# Patient Record
Sex: MALE | Race: BLACK OR AFRICAN AMERICAN | Employment: FULL TIME | URBAN - METROPOLITAN AREA
[De-identification: names, ages, dates, MRNs, and addresses within clinical notes are randomized per-mention and may not be internally consistent; named-entity substitution may affect disease eponyms.]

---

## 2017-02-27 ENCOUNTER — HOSPITAL ENCOUNTER (INPATIENT)
Facility: CLINIC | Age: 32
LOS: 2 days | Discharge: HOME | DRG: 885 | End: 2017-03-01
Attending: PSYCHIATRY & NEUROLOGY | Admitting: PSYCHIATRY & NEUROLOGY
Payer: MEDICARE

## 2017-02-27 DIAGNOSIS — F20.0 CHRONIC PARANOID SCHIZOPHRENIA (H): ICD-10-CM

## 2017-02-27 PROBLEM — F20.9 SCHIZOPHRENIA (H): Status: ACTIVE | Noted: 2017-02-27

## 2017-02-27 LAB
AMPHETAMINES UR QL SCN: NORMAL
BARBITURATES UR QL: NORMAL
BENZODIAZ UR QL: NORMAL
CANNABINOIDS UR QL SCN: NORMAL
COCAINE UR QL: NORMAL
ETHANOL UR QL SCN: NORMAL
OPIATES UR QL SCN: NORMAL

## 2017-02-27 PROCEDURE — 12400001 ZZH R&B MH UMMC

## 2017-02-27 PROCEDURE — 80320 DRUG SCREEN QUANTALCOHOLS: CPT | Performed by: FAMILY MEDICINE

## 2017-02-27 PROCEDURE — 90791 PSYCH DIAGNOSTIC EVALUATION: CPT

## 2017-02-27 PROCEDURE — 80307 DRUG TEST PRSMV CHEM ANLYZR: CPT | Performed by: FAMILY MEDICINE

## 2017-02-27 PROCEDURE — 99285 EMERGENCY DEPT VISIT HI MDM: CPT | Mod: 25

## 2017-02-27 PROCEDURE — 99284 EMERGENCY DEPT VISIT MOD MDM: CPT | Mod: Z6 | Performed by: PSYCHIATRY & NEUROLOGY

## 2017-02-27 RX ORDER — HYDROXYZINE HYDROCHLORIDE 25 MG/1
25-50 TABLET, FILM COATED ORAL EVERY 4 HOURS PRN
Status: DISCONTINUED | OUTPATIENT
Start: 2017-02-27 | End: 2017-03-01 | Stop reason: HOSPADM

## 2017-02-27 RX ORDER — DIPHENHYDRAMINE HCL 50 MG
50 CAPSULE ORAL DAILY
COMMUNITY

## 2017-02-27 RX ORDER — OLANZAPINE 10 MG/1
30 TABLET ORAL AT BEDTIME
Status: DISCONTINUED | OUTPATIENT
Start: 2017-02-28 | End: 2017-03-01 | Stop reason: HOSPADM

## 2017-02-27 RX ORDER — PROPRANOLOL HYDROCHLORIDE 20 MG/1
20 TABLET ORAL 3 TIMES DAILY
Status: DISCONTINUED | OUTPATIENT
Start: 2017-02-28 | End: 2017-03-01 | Stop reason: HOSPADM

## 2017-02-27 RX ORDER — LITHIUM CARBONATE 300 MG/1
600 TABLET, FILM COATED, EXTENDED RELEASE ORAL 2 TIMES DAILY
Status: DISCONTINUED | OUTPATIENT
Start: 2017-02-28 | End: 2017-03-01 | Stop reason: HOSPADM

## 2017-02-27 ASSESSMENT — ENCOUNTER SYMPTOMS
SHORTNESS OF BREATH: 0
ABDOMINAL PAIN: 0
NERVOUS/ANXIOUS: 0
HALLUCINATIONS: 0
DYSPHORIC MOOD: 1
FEVER: 0

## 2017-02-27 NOTE — IP AVS SNAPSHOT
MRN:[id number]                      After Visit Summary   2/27/2017    Neil Andersen    MRN: [id number]           Thank you!     Thank you for choosing [facility] for your care. Our goal is always to provide you with excellent care.        Patient Information     Date Of Birth          1985        About your hospital stay     You were admitted on:  February 27, 2017 You last received care in the:   20NB    You were discharged on:  March 1, 2017       Who to Call     For medical emergencies, please call 911.  For non-urgent questions about your medical care, please call your primary care provider or clinic, [phone]          Attending Provider     Provider Specialty    Minesh Nance MD Emergency Medicine    [facility], Victor Manuel Echeverria MD Psychiatry    Stuart Priest MD Psychiatry    [facility]       Primary Care Provider Office Phone # Fax #    Stashanice A MD [place] [phone]       [facility] [address] [place] [address]        Further instructions from your care team       Behavioral Discharge Planning and Instructions    Summary: Patient admitted from [facility] for suicidal ideation and hopelessness. Prior to [place], patient was discharged from [facility] for alcohol intoxication. Patient has declined referral for MICD treatment and is looking for housing. He will be discharged to work with his [facility] .     Main Diagnosis: mood disorder, alcohol abuse    Major Treatments, Procedures and Findings: Psychological assessment, medication management    Symptoms to Report: feeling more aggressive, increased confusion, losing more sleep, mood getting worse or thoughts of suicide    Lifestyle Adjustment: Please refrain from all mood altering substances    Psychiatry Follow-up:   [facility]  [phone]   FAX:[phone] [facility] is requesting the hospital fax the discharge summary prior to making an  "appointment.  [address] [facility]  Dr. Collado        from [facility] Miguel [phone]  (c) [phone]    Resources:   Crisis Intervention: 133.487.2050 or 772-866-9737 (TTY: 988.768.7568).  Call anytime for help.  National [place] on Mental Illness (www.mn.amada.org): 480.401.2254 or 869-181-8963.  Alcoholics Anonymous (www.alcoholics-anonymous.org): Check your phone book for your local chapter.  Suicide Awareness Voices of Education (SAVE) (www.save.org): 264-888-LRAT (6053)  National Suicide Prevention Line (www.mentalOMsignal.org): 501-996-EOZN (5489)    General Medication Instructions:   See your medication sheet(s) for instructions.   Take all medicines as directed.  Make no changes unless your doctor suggests them.   Go to all your doctor visits.  Be sure to have all your required lab tests. This way, your medicines can be refilled on time.  Do not use any drugs not prescribed by your doctor.  Avoid alcohol.      The treatment team has appreciated the opportunity to work with you.  We wish you the best in the future. If you have any questions or concerns our unit number is [phone].    Pending Results     No orders found from 2/25/2017 to 2/28/2017.            Statement of Approval     Ordered          03/01/17 1517  I have reviewed and agree with all the recommendations and orders detailed in this document.  EFFECTIVE NOW     Approved and electronically signed by:  Stefan Guerra MD             Admission Information     Date & Time Provider Department Dept. Phone    2/27/2017 Stefan Guerra MD [facility] [phone]      Your Vitals Were     Blood Pressure Pulse Temperature Respirations Pulse Oximetry       129/78 89 98.6  F (37  C) (Tympanic) 16 98%       [employer] Information     [employer] lets you send messages to your doctor, view your test results, renew your prescriptions, schedule appointments and more. To sign up, go to [email or web address] . Click on \"Log " "in\" on the left side of the screen, which will take you to the Welcome page. Then click on \"Sign up Now\" on the right side of the page.     You will be asked to enter the access code listed below, as well as some personal information. Please follow the directions to create your username and password.     Your access code is: [email or web address]  Expires: 2017  3:29 PM     Your access code will  in 90 days. If you need help or a new code, please call your [place] clinic or [phone].        Care EveryWhere ID     This is your Care EveryWhere ID. This could be used by other organizations to access your [place] medical records  [id number]           Review of your medicines      CONTINUE these medicines which have NOT CHANGED        Dose / Directions    BANOPHEN 50 MG capsule   Indication:  Extrapyramidal Reaction   Generic drug:  diphenhydrAMINE        Dose:  50 mg   Take 50 mg by mouth daily   Refills:  0       CEROVITE ADVANCED FORMULA PO        Refills:  0       FLUoxetine 20 MG capsule   Commonly known as:  PROzac   Used for:  Paranoid schizophrenia, chronic condition (H)        Dose:  60 mg   Take 3 capsules (60 mg) by mouth At Bedtime   Quantity:  90 capsule   Refills:  1       lithium 300 MG CR tablet   Commonly known as:  ESKALITH/LITHOBID   Used for:  Paranoid schizophrenia, chronic condition (H)        Dose:  600 mg   Take 2 tablets (600 mg) by mouth 2 times daily   Quantity:  120 tablet   Refills:  1       OLANZapine 15 MG tablet   Commonly known as:  zyPREXA   Used for:  Paranoid schizophrenia, chronic condition (H)        Dose:  30 mg   Take 2 tablets (30 mg) by mouth At Bedtime   Quantity:  60 tablet   Refills:  1       propranolol 20 MG tablet   Commonly known as:  INDERAL   Used for:  Paranoid schizophrenia, chronic condition (H), Extrapyramidal symptom        Dose:  20 mg   Take 1 tablet (20 mg) by mouth 3 times daily   Quantity:  90 tablet   Refills:  1         STOP taking     NICORELIEF MT "                    Protect others around you: Learn how to safely use, store and throw away your medicines at www.disposemymeds.org.             Medication List: This is a list of all your medications and when to take them. Check marks below indicate your daily home schedule. Keep this list as a reference.      Medications           Morning Afternoon Evening Bedtime As Needed    BANOPHEN 50 MG capsule   Take 50 mg by mouth daily   Generic drug:  diphenhydrAMINE                                CEROVITE ADVANCED FORMULA PO                                FLUoxetine 20 MG capsule   Commonly known as:  PROzac   Take 3 capsules (60 mg) by mouth At Bedtime   Last time this was given:  60 mg on 2/28/2017  8:14 PM                                lithium 300 MG CR tablet   Commonly known as:  ESKALITH/LITHOBID   Take 2 tablets (600 mg) by mouth 2 times daily   Last time this was given:  600 mg on 3/1/2017 10:50 AM                                OLANZapine 15 MG tablet   Commonly known as:  zyPREXA   Take 2 tablets (30 mg) by mouth At Bedtime   Last time this was given:  30 mg on 2/28/2017  8:14 PM                                propranolol 20 MG tablet   Commonly known as:  INDERAL   Take 1 tablet (20 mg) by mouth 3 times daily   Last time this was given:  20 mg on 3/1/2017  2:25 PM

## 2017-02-27 NOTE — IP AVS SNAPSHOT
[address] [address]    Phone:  [phone]                                       After Visit Summary   2/27/2017    Neil Andersen    MRN: [id number]           After Visit Summary Signature Page     I have received my discharge instructions, and my questions have been answered. I have discussed any challenges I see with this plan with the nurse or doctor.    ..........................................................................................................................................  Patient/Patient Representative Signature      ..........................................................................................................................................  Patient Representative Print Name and Relationship to Patient    ..................................................               ................................................  Date                                            Time    ..........................................................................................................................................  Reviewed by Signature/Title    ...................................................              ..............................................  Date                                                            Time

## 2017-02-28 PROCEDURE — H2032 ACTIVITY THERAPY, PER 15 MIN: HCPCS

## 2017-02-28 PROCEDURE — 12400001 ZZH R&B MH UMMC

## 2017-02-28 PROCEDURE — 90853 GROUP PSYCHOTHERAPY: CPT

## 2017-02-28 PROCEDURE — 25000132 ZZH RX MED GY IP 250 OP 250 PS 637: Mod: GY | Performed by: PSYCHIATRY & NEUROLOGY

## 2017-02-28 PROCEDURE — A9270 NON-COVERED ITEM OR SERVICE: HCPCS | Mod: GY | Performed by: PSYCHIATRY & NEUROLOGY

## 2017-02-28 PROCEDURE — 99223 1ST HOSP IP/OBS HIGH 75: CPT | Mod: AI | Performed by: PSYCHIATRY & NEUROLOGY

## 2017-02-28 RX ADMIN — PROPRANOLOL HYDROCHLORIDE 20 MG: 20 TABLET ORAL at 20:14

## 2017-02-28 RX ADMIN — FLUOXETINE 60 MG: 20 CAPSULE ORAL at 00:06

## 2017-02-28 RX ADMIN — LITHIUM CARBONATE 600 MG: 300 TABLET, FILM COATED, EXTENDED RELEASE ORAL at 20:56

## 2017-02-28 RX ADMIN — HYDROXYZINE HYDROCHLORIDE 50 MG: 25 TABLET ORAL at 00:06

## 2017-02-28 RX ADMIN — OLANZAPINE 30 MG: 10 TABLET, FILM COATED ORAL at 00:06

## 2017-02-28 RX ADMIN — OLANZAPINE 30 MG: 10 TABLET, FILM COATED ORAL at 20:14

## 2017-02-28 RX ADMIN — PROPRANOLOL HYDROCHLORIDE 20 MG: 20 TABLET ORAL at 09:09

## 2017-02-28 RX ADMIN — LITHIUM CARBONATE 600 MG: 300 TABLET, FILM COATED, EXTENDED RELEASE ORAL at 00:05

## 2017-02-28 RX ADMIN — FLUOXETINE 60 MG: 20 CAPSULE ORAL at 20:14

## 2017-02-28 RX ADMIN — PROPRANOLOL HYDROCHLORIDE 20 MG: 20 TABLET ORAL at 13:44

## 2017-02-28 RX ADMIN — LITHIUM CARBONATE 600 MG: 300 TABLET, FILM COATED, EXTENDED RELEASE ORAL at 09:09

## 2017-02-28 ASSESSMENT — ACTIVITIES OF DAILY LIVING (ADL)
SWALLOWING: 0-->SWALLOWS FOODS/LIQUIDS WITHOUT DIFFICULTY
ORAL_HYGIENE: INDEPENDENT
AMBULATION: 0-->INDEPENDENT
GROOMING: INDEPENDENT
TRANSFERRING: 0-->INDEPENDENT
DRESS: SCRUBS (BEHAVIORAL HEALTH)
BATHING: 0-->INDEPENDENT
COGNITION: 0 - NO COGNITION ISSUES REPORTED
DRESS: 0-->INDEPENDENT
DRESS: INDEPENDENT
RETIRED_EATING: 0-->INDEPENDENT
ORAL_HYGIENE: INDEPENDENT
FALL_HISTORY_WITHIN_LAST_SIX_MONTHS: NO
RETIRED_COMMUNICATION: 0-->UNDERSTANDS/COMMUNICATES WITHOUT DIFFICULTY
HYGIENE/GROOMING: INDEPENDENT
PRIOR_FUNCTIONAL_LEVEL_COMMENT: VERY GOOD
TOILETING: 0-->INDEPENDENT

## 2017-02-28 NOTE — ED NOTES
Pt arrived via EMS for feelings of hopelessness. Staff at crisis center overheard pt speaking of suicide.

## 2017-02-28 NOTE — PROGRESS NOTES
Patient was redirected after hugging female patient.  Upset with staff, said we were not his parents.  Irritable with redirection.

## 2017-02-28 NOTE — PROGRESS NOTES
02/28/17 1343   Behavioral Health   Hallucinations denies / not responding to hallucinations   Thinking distractable   Orientation person: oriented;place: oriented   Memory baseline memory   Insight poor   Judgement impaired   Eye Contact at examiner   Affect irritable;blunted, flat;angry   Mood depressed;irritable   Physical Appearance/Attire attire appropriate to age and situation   Hygiene well groomed   Suicidality other (see comments)  (Refused to talk )   Self Injury other (see comment)  (Refused to talk)   Activity restless   Speech clear   Psycho Education   Type of Intervention 1:1 intervention   Response refuses   Treatment Detail Refused   Activities of Daily Living   Hygiene/Grooming independent   Oral Hygiene independent   Dress independent   Room Organization independent     Early in the shift patient was irritable and short with staff.  In late morning patient was out and pacing in hallway and interacting with peers, and appeared to he in a better mood, but still flat.  Patient was engaged with peers, but refused to talk with staff for a check in.

## 2017-02-28 NOTE — H&P
"DATE OF ASSESSMENT:  02/28/2017      IDENTIFYING INFORMATION:  Neil Andersen is a 31-year-old  male, currently homeless, unemployed and on Social Security Disability.      CHIEF COMPLAINT:  \"I'm homeless, I was kicked out of my IRTS and I can't see my daughter.\"      HISTORY OF PRESENT ILLNESS:  The patient has a history of schizophrenia who was referred to the emergency room for mental health admission by the [facility] facility due to concern for ongoing suicidal thoughts.  The patient had been admitted to the [facility] approximately 2-1/2 months ago.  He recalls doing poorly there, mostly due to his frequent usage of alcohol.  He described that on a daily basis he would leave the facility to acquire and consume alcohol and would frequently return intoxicated.  Approximately 1 week ago, they once again noted his state of intoxication and also noted inappropriate conduct, which the patient is not willing to clarify for me today, which eventually resulted in him being kicked out of the residential treatment facility.  He was then referred to the [facility], however continued to report a strong intensity of suicidal urges, prompting them to refer him to the emergency room for mental health admission.  In our emergency room he reported various plans for suicide, ranging from cutting his throat to hanging himself or jumping off of a bridge.  He was seeking inpatient hospitalization.  On examination today, he tells me that he is depressed secondary to his homeless situation.  Other psychosocial stressors he identified include not being able to see his daughter as often as he would like.  He further described limited social supports, describing his friends as being negative influences and having no meaningful relationship with family members.  He feels isolated, helpless, hopeless and sad.  He questions why he is not on a wait list for Section 8 housing " and questions the efficiency of his .  He denied any psychotic symptoms and had no other meaningful reports today.      PSYCHIATRIC REVIEW OF SYSTEMS:  Regarding the depressive episode, he tells me that his mood is depressed which he characterized as severe.  He further reports low energy, low appetite, low concentration.  He described anhedonia.  He feels helpless, hopeless.  He endorses suicidal thoughts.  He feels safe in the hospital.  He denied homicidal thoughts.  He denied symptoms of aayush.  He denied psychotic symptoms, including auditory and visual hallucinations.  He denied any paranoid delusions, howeverdid appear moderately paranoid on meeting today.  No ideas of reference, thought insertion or thought extraction identified.  He denied symptoms corresponding to DYLAN, panic disorder, PTSD, eating disorder or OCD.      PAST PSYCHIATRIC HISTORY:  Established diagnosis of schizophrenia with multiple prior inpatient hospitalizations, 2 prior suicide attempts via medication overdose in 2010 and 2011.  He has been on multiple prior medication trials, some of which have included Risperdal, Seroquel, Abilify, Zyprexa, lithium, Prozac, however has most recently been maintained on a combination of lithium, Prozac and Zyprexa.  Records indicate at least 3 prior court-ordered mental health treatments with no active commitment identified.  His care is managed by his outpatient psychiatrist.  He currently does not have a therapist.      SUBSTANCE ABUSE HISTORY:  Long history of cannabis and alcohol usage.  His most frequented substance is alcohol, describing regular usage on a daily basis while in residential treatment over the past 2-3 months.  He was not able to report any meaningful dependence or withdrawal to alcohol.  His last alcoholic beverage was approximately 1 week ago.  Cannabis usage is intermittent, smoking when available.  No recent usage reported.  He has been to treatment in the past.  No  complicated withdrawals stemming from DTs or seizures reported.      MEDICAL HISTORY:  No active issues identified.      FAMILY HISTORY:  No knowledge of mental health or suicides in the family; however, he was guarded when questioned regarding family.      SOCIAL HISTORY:  Refer to the psychosocial assessment completed by our .  The patient is currently on Social Security Disability.  He is unemployed and homeless.  He was most recently living in a residential treatment facility.  He identified limited social supports and limited involvement with family.  He referenced 1 daughter, however has not had recent contact with her.  No active legal issues reported.  He did make reference to a  whom he frequently works with.      MEDICAL REVIEW OF SYSTEMS:  A 10-point systems were reviewed and were positive for psychiatric symptoms, as noted above, otherwise negative.      PHYSICAL EXAMINATION:  Blood pressure 125/86, pulse 78, temperature 98.3, respirations 16.  The rest of the physical examination was reviewed in the emergency room documentation.      MENTAL STATUS EXAMINATION:  The patient appears his stated age, dressed in hospital scrubs with adequate hygiene.  He was sitting out in the common area by himself prior to our meeting.  No psychomotor abnormalities noted.  He was initially guarded and hesitant to participate in the interview, questioning my role in this hospital as well as my role in his plan of care.  After reviewing my name ailin and asking me a few similar questions, he gained enough comfort to proceed with the interview.  No psychomotor abnormalities.  Eye contact was occasional.  Speech was limited in content, not pushed or pressured.  His mood was described as being depressed.  Affect was mostly flat; however, there were moments where he would joke and smile accordingly, mostly when he described his need to abstain from alcohol.  His thought process was linear,  tight, concrete.  Associations were intact.  Thought content displayed evidence of paranoia, given his guarded presentation.  He endorsed suicidal thoughts, however was able to contract for safety in the hospital.  He denied homicidal thoughts.  He denied auditory and visual hallucinations.  He did not appear to be distracted by psychotic stimuli resembling hallucinations.  Insight and judgment appear fair.  Cognition appears intact to interviewing, including orientation to person, place, time and situation; use of language; fund of knowledge; recent and remote memory.  Muscle strength, tone and gait appear normal on visual inspection.      ASSESSMENT:   1.  Schizophrenia.   2.  Alcohol use disorder, moderate.   3.  Cannabis use disorder, moderate.      PLAN:   1.  The patient has been admitted to our Behavioral Health Unit on station 20 under voluntary status for reports of depressed mood and suicidal thoughts in the setting of homelessness and recently being kicked out of a residential treatment facility for ongoing alcohol usage and intoxication.  His care will be resumed by Dr. Guerra beginning tomorrow.   2.  His outpatient medications have been resumed.  He reports compliance for at least the past 1 week and identified these medications as being effective at targeting mood stabilization and reducing psychotic symptoms.  These include a combination of Zyprexa, lithium, Prozac and propranolol.  Risks and benefits of his medications were reviewed, and the patient consented to treatment, as outlined in this document. Serum lithium level, CMP, CBC, and TSH have been ordered for review.   3.  Psychosocial treatments to be addressed with Social Work consult and groups.  Consider obtaining a chemical health assessment to explore available residential treatment options for the patient.   4.  Anticipated hospital stay of approximately 1 week as we target remission of suicidal thoughts while formulating a plan of  care to effectively transition his care out of the hospital.         TIM VILLALOBOS MD             D: 2017 14:15   T: 2017 15:13   MT: JAM      Name:     ROXANNA BAÑUELOS   MRN:      [id number]        Account:      [id number]   :      1985           Admitted:     [id number]      Document: [id number]

## 2017-02-28 NOTE — ED NOTES
Bed: HW02  Expected date: 2/27/17  Expected time: 7:16 PM  Means of arrival:   Comments:  Edvin 637. 31 y.o M, coming for crisis eval + feeling of hopelessness, + suicidal thoughts. Calm cooperative. Yellow. 10 mins

## 2017-02-28 NOTE — ED PROVIDER NOTES
History     Chief Complaint   Patient presents with     Psychiatric Evaluation     feeling of hopelesness      The history is provided by the patient and medical records (staff at [facility]).     Neil Andersen is a 31 year old male who comes in due to him stating that he is suicidal.  He was at [facility] as a crisis placement after he was kicked out of his IRTS for being intoxicated. He states he has not had a drink since then (around 5 days).  He has been taking his medications. He is very vague in his answers and states he cannot be safe. He denies hallucinations. He states he is suicidal but could not give a plan. He states he has many options. [facility] not feel comfortable [facility] him there due to the safety factor. He has a history of paranoid schizophrenia. He is hospitalized a lot. He was last here over the summer of 2016 and stayed for 6 weeks. He went to an IRTS facility due to [place] house feeling he was not a good fit for them.      Please see the 's assessment for further details.    I have reviewed the Medications, Allergies, Past Medical and Surgical History, and Social History in the Epic system.    Review of Systems   Constitutional: Negative for fever.   Respiratory: Negative for shortness of breath.    Cardiovascular: Negative for chest pain.   Gastrointestinal: Negative for abdominal pain.   Psychiatric/Behavioral: Positive for dysphoric mood and suicidal ideas. Negative for hallucinations and self-injury. The patient is not nervous/anxious.    All other systems reviewed and are negative.      Physical Exam   BP: 119/83  Pulse: 96  Temp: 98.6  F (37  C)  Resp: 18  SpO2: 97 %  Physical Exam   Constitutional: He is oriented to person, place, and time. He appears well-developed and well-nourished.   HENT:   Head: Normocephalic and atraumatic.   Mouth/Throat: Oropharynx is clear and moist. No oropharyngeal exudate.   Eyes: Pupils are equal, round, and reactive to light.    Neck: Normal range of motion. Neck supple.   Cardiovascular: Normal rate, regular rhythm and normal heart sounds.    Pulmonary/Chest: Effort normal and breath sounds normal. No respiratory distress.   Abdominal: Soft. Bowel sounds are normal. There is no tenderness.   Musculoskeletal: Normal range of motion.   Neurological: He is alert and oriented to person, place, and time.   Skin: Skin is warm. No rash noted.   Psychiatric: His speech is normal and behavior is normal. Judgment normal. He is not actively hallucinating. Thought content is not paranoid and not delusional. Cognition and memory are normal. He exhibits a depressed mood. He expresses suicidal ideation. He expresses no homicidal ideation. He expresses suicidal plans. He expresses no homicidal plans.   Neil is a 30 y/o male who looks his age. He is well groomed with good eye contact.   Nursing note and vitals reviewed.      ED Course     ED Course     Procedures               Labs Ordered and Resulted from Time of ED Arrival Up to the Time of Departure from the ED   DRUG ABUSE SCREEN 6 CHEM DEP URINE ([facility])       Assessments & Plan (with Medical Decision Making)   Neil will be admitted to the hospital due to his insistence that he is suicidal and not safe.  There seems to be some secondary gain and malingering behavior in order to have a place to stay.  There is no documented history of this but there are frequent hospitalization even when he had a place to stay. He will go to station 20 under Dr. Rapp.    I have reviewed the nursing notes.    I have reviewed the findings, diagnosis, plan and need for follow up with the patient.    New Prescriptions    No medications on file       Final diagnoses:   Chronic paranoid schizophrenia (H)       2/27/2017   [facility] [place], EMERGENCY DEPARTMENT     Minesh Nance MD  02/27/17 2477

## 2017-02-28 NOTE — PROGRESS NOTES
02/28/17 0116   Patient Belongings   Did you bring any home meds/supplements to the hospital?  Yes   Disposition of meds  Sent to security/pharmacy per site process   Patient Belongings cell phone/electronics;clothing;dental appliance/dentures;shoes;wallet   Disposition of Belongings 4 large bags full of Belongings. FARZANEH Suárez sent Medicines to Security. No Cash present.   Belongings Search Yes   Clothing Search Yes   Second Staff Sushant and ?   PT. Belongings (4 bags) : 2 Pairs of Shoes, 2 Hats, 4 Pairs of socks, 2 Coats/jackets, 5 Sweaters, 1 Lighter, 1 Cell Phone, 1 [place] Id card, 3 Pairs of Jeans, 2 shirts, 1 Cloth belt, 1 Bottle of Shampoo, 2 Towels, 3 Sweatpants, 2 bags of crafts, 1 Wallet with miscellaneous cards (NO CASH)    ADMISSION:  I am responsible for any personal items that are not sent to the safe or pharmacy. [facility] is not responsible for loss, theft or damage of any property in my possession.  Patient Signature _____________________ Date/Time _____________________  Staff Signature _______________________ Date/Time _____________________  2nd Staff person, if patient is unable/unwilling to sign  ___________________________________ Date/Time _____________________  DISCHARGE:  My personal items have been returned to me.   Patient Signature _____________________ Date/Time _____________________
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
## 2017-02-28 NOTE — PROGRESS NOTES
Female patient reported patient has been making flirting comments and felt uncomfortable.  Patient becomes easily angered mood is up and down and says really derogatory comments to staff.   Can be charming.  Appetite is good unwilling to finish admit.  Will wait for more stable mood.

## 2017-03-01 VITALS
RESPIRATION RATE: 16 BRPM | DIASTOLIC BLOOD PRESSURE: 78 MMHG | OXYGEN SATURATION: 98 % | TEMPERATURE: 98.6 F | HEART RATE: 89 BPM | SYSTOLIC BLOOD PRESSURE: 129 MMHG

## 2017-03-01 LAB
ALBUMIN SERPL-MCNC: 3.5 G/DL (ref 3.4–5)
ALP SERPL-CCNC: 76 U/L (ref 40–150)
ALT SERPL W P-5'-P-CCNC: 59 U/L (ref 0–70)
ANION GAP SERPL CALCULATED.3IONS-SCNC: 5 MMOL/L (ref 3–14)
AST SERPL W P-5'-P-CCNC: 39 U/L (ref 0–45)
BASOPHILS # BLD AUTO: 0 10E9/L (ref 0–0.2)
BASOPHILS NFR BLD AUTO: 0.3 %
BILIRUB SERPL-MCNC: 0.4 MG/DL (ref 0.2–1.3)
BUN SERPL-MCNC: 13 MG/DL (ref 7–30)
CALCIUM SERPL-MCNC: 8.7 MG/DL (ref 8.5–10.1)
CHLORIDE SERPL-SCNC: 106 MMOL/L (ref 94–109)
CO2 SERPL-SCNC: 27 MMOL/L (ref 20–32)
CREAT SERPL-MCNC: 1.04 MG/DL (ref 0.66–1.25)
DIFFERENTIAL METHOD BLD: NORMAL
EOSINOPHIL # BLD AUTO: 0.3 10E9/L (ref 0–0.7)
EOSINOPHIL NFR BLD AUTO: 5 %
ERYTHROCYTE [DISTWIDTH] IN BLOOD BY AUTOMATED COUNT: 14.2 % (ref 10–15)
GFR SERPL CREATININE-BSD FRML MDRD: 82 ML/MIN/1.7M2
GLUCOSE SERPL-MCNC: 100 MG/DL (ref 70–99)
HCT VFR BLD AUTO: 45.4 % (ref 40–53)
HGB BLD-MCNC: 15.2 G/DL (ref 13.3–17.7)
IMM GRANULOCYTES # BLD: 0 10E9/L (ref 0–0.4)
IMM GRANULOCYTES NFR BLD: 0.2 %
LITHIUM SERPL-SCNC: 0.8 MMOL/L (ref 0.6–1.2)
LYMPHOCYTES # BLD AUTO: 2.7 10E9/L (ref 0.8–5.3)
LYMPHOCYTES NFR BLD AUTO: 44.3 %
MCH RBC QN AUTO: 31 PG (ref 26.5–33)
MCHC RBC AUTO-ENTMCNC: 33.5 G/DL (ref 31.5–36.5)
MCV RBC AUTO: 93 FL (ref 78–100)
MONOCYTES # BLD AUTO: 0.5 10E9/L (ref 0–1.3)
MONOCYTES NFR BLD AUTO: 9 %
NEUTROPHILS # BLD AUTO: 2.5 10E9/L (ref 1.6–8.3)
NEUTROPHILS NFR BLD AUTO: 41.2 %
NRBC # BLD AUTO: 0 10*3/UL
NRBC BLD AUTO-RTO: 0 /100
PLATELET # BLD AUTO: 250 10E9/L (ref 150–450)
POTASSIUM SERPL-SCNC: 3.8 MMOL/L (ref 3.4–5.3)
PROT SERPL-MCNC: 6.6 G/DL (ref 6.8–8.8)
RBC # BLD AUTO: 4.91 10E12/L (ref 4.4–5.9)
SODIUM SERPL-SCNC: 138 MMOL/L (ref 133–144)
TSH SERPL DL<=0.005 MIU/L-ACNC: 2.24 MU/L (ref 0.4–4)
WBC # BLD AUTO: 6 10E9/L (ref 4–11)

## 2017-03-01 PROCEDURE — 84443 ASSAY THYROID STIM HORMONE: CPT | Performed by: PSYCHIATRY & NEUROLOGY

## 2017-03-01 PROCEDURE — 85025 COMPLETE CBC W/AUTO DIFF WBC: CPT | Performed by: PSYCHIATRY & NEUROLOGY

## 2017-03-01 PROCEDURE — 90853 GROUP PSYCHOTHERAPY: CPT

## 2017-03-01 PROCEDURE — 80053 COMPREHEN METABOLIC PANEL: CPT | Performed by: PSYCHIATRY & NEUROLOGY

## 2017-03-01 PROCEDURE — 25000132 ZZH RX MED GY IP 250 OP 250 PS 637: Mod: GY | Performed by: PSYCHIATRY & NEUROLOGY

## 2017-03-01 PROCEDURE — A9270 NON-COVERED ITEM OR SERVICE: HCPCS | Mod: GY | Performed by: PSYCHIATRY & NEUROLOGY

## 2017-03-01 PROCEDURE — 99239 HOSP IP/OBS DSCHRG MGMT >30: CPT | Performed by: PSYCHIATRY & NEUROLOGY

## 2017-03-01 PROCEDURE — 80178 ASSAY OF LITHIUM: CPT | Performed by: PSYCHIATRY & NEUROLOGY

## 2017-03-01 PROCEDURE — 36415 COLL VENOUS BLD VENIPUNCTURE: CPT | Performed by: PSYCHIATRY & NEUROLOGY

## 2017-03-01 RX ADMIN — LITHIUM CARBONATE 600 MG: 300 TABLET, FILM COATED, EXTENDED RELEASE ORAL at 10:50

## 2017-03-01 RX ADMIN — PROPRANOLOL HYDROCHLORIDE 20 MG: 20 TABLET ORAL at 14:25

## 2017-03-01 RX ADMIN — PROPRANOLOL HYDROCHLORIDE 20 MG: 20 TABLET ORAL at 10:50

## 2017-03-01 ASSESSMENT — ACTIVITIES OF DAILY LIVING (ADL)
DRESS: STREET CLOTHES
ORAL_HYGIENE: INDEPENDENT
HYGIENE/GROOMING: INDEPENDENT

## 2017-03-01 NOTE — PROGRESS NOTES
"   03/01/17 1411   Quick Adds   Quick Adds (Pt refused to talk to staff.)   Behavioral Health   Thinking confused   Orientation place: oriented   Eye Contact at examiner   Affect blunted, flat   Mood mood is calm   Physical Appearance/Attire neat   Hygiene neglected grooming - unclean body, hair, teeth   Speech clear   Psychomotor / Gait steady   Pt has been visible, he has been talkative with an individual. He refused to talk with stating\" that he didn't want to talk with this staff or any staff because they wanted him to go to treatment.\"  "

## 2017-03-01 NOTE — PROGRESS NOTES
Patient apologized for his rudeness this evening towards staff. Took full responsibility and was sincere

## 2017-03-01 NOTE — PROGRESS NOTES
"Patient reports feeling stressed and confused. Patient initially refused to answer questions regarding self-harm and suicide but later told staff he had no such thoughts. Patient also said he does hear voices but that they are \"just his own irritation\". Patient was visible in milieu with erratic and occassionally aggressive socialization. Patient noted he was concerned about weight gain from his medications.     02/28/17 2200   Behavioral Health   Hallucinations other (see comment)  (Reports voices that are \"just his own irritation\")   Thinking confused;distractable   Orientation person: oriented;place: oriented;date: oriented;time: oriented   Memory baseline memory   Insight poor   Judgement impaired   Eye Contact at examiner   Affect blunted, flat;irritable   Mood depressed;irritable   Physical Appearance/Attire attire appropriate to age and situation   Hygiene well groomed   Suicidality other (see comments)  (Denies after initial refusal to asnwer.)   Self Injury other (see comment)  (Denies)   Activity restless   Speech clear;coherent   Medication Sensitivity no stated side effects;no observed side effects   Psychomotor / Gait balanced;steady   Activities of Daily Living   Hygiene/Grooming independent   Oral Hygiene independent   Dress scrubs (behavioral health)   Room Organization independent     "

## 2017-03-01 NOTE — PROGRESS NOTES
Writer approached pt to discuss residential treatment. He was not willing to do this and wanted housing. Writer stated the hospital does not provide housing and that his CM has been contacted twice to discuss discharge planning. Pt then stated he would go to treatment. Writer stated someon might be out tomorrow to complete R25.

## 2017-03-01 NOTE — DISCHARGE INSTRUCTIONS
Behavioral Discharge Planning and Instructions    Summary: Patient admitted from [facility] for suicidal ideation and hopelessness. Prior to [place], patient was discharged from [facility][1] for alcohol intoxication. Patient has declined referral for MICD treatment and is looking for housing. He will be discharged to work with his [facility] .     Main Diagnosis: mood disorder, alcohol abuse    Major Treatments, Procedures and Findings: Psychological assessment, medication management    Symptoms to Report: feeling more aggressive, increased confusion, losing more sleep, mood getting worse or thoughts of suicide    Lifestyle Adjustment: Please refrain from all mood altering substances    Psychiatry Follow-up:   [facility]  [phone]   FAX:[phone] [facility] is requesting the hospital fax the discharge summary prior to making an appointment.  [address] [place] [address]  Dr. Collado        from [facility]: Miguel [phone]  (c) [phone]    Resources:   Crisis Intervention: 104.881.3565 or 931-522-5668 (TTY: 271.742.8059).  Call anytime for help.  National [place] on Mental Illness (www.mn.amada.org): 203.495.5760 or 171-133-7493.  Alcoholics Anonymous (www.alcoholics-anonymous.org): Check your phone book for your local chapter.  Suicide Awareness Voices of Education (SAVE) (www.save.org): 259-799-GWKV (0337)  National Suicide Prevention Line (www.mentalhealthmn.org): 347-618-CUJH (4519)    General Medication Instructions:   See your medication sheet(s) for instructions.   Take all medicines as directed.  Make no changes unless your doctor suggests them.   Go to all your doctor visits.  Be sure to have all your required lab tests. This way, your medicines can be refilled on time.  Do not use any drugs not prescribed by your doctor.  Avoid alcohol.      The treatment team has appreciated the opportunity to work with you.  We wish you the best in the future. If you have any questions or concerns  our unit number is [phone].

## 2017-03-01 NOTE — PROGRESS NOTES
Patient again complaining that he wanted all medication now waiting for pharmacy to send them and patient became agitaed  that I could not hear him from medication window very abusive towards staff. Very loud and disruptive.

## 2017-03-01 NOTE — PROGRESS NOTES
Pt received his personal belongings, discharge instruction and verbalized understanding of discharge instructions. Was walked of unit at approx 1550 by unit staff and security. Staff called a cab to take Pt to shelter.

## 2017-03-01 NOTE — DISCHARGE SUMMARY
"Psychiatric Discharge Summary    Neil Andersen MRN# [id number]   Age: 31 year old YOB: 1985     Date of Admission:  2/27/2017  Date of Discharge:  3/1/2017  Admitting Physician:  Stuart Priest MD   Discharge Physician:  Stefan Guerra MD (Contact: Pager: [phone])         Event Leading to Hospitalization:   \"I'm homeless, I was kicked out of my [facility] and I can't see my daughter.\"       HISTORY OF PRESENT ILLNESS: The patient has a history of schizophrenia who was referred to the emergency room for mental health admission by the [facility] facility due to concern for ongoing suicidal thoughts. The patient had been admitted to the [facility] approximately 2-1/2 months ago. He recalls doing poorly there, mostly due to his frequent usage of alcohol. He described that on a daily basis he would leave the facility to acquire and consume alcohol and would frequently return intoxicated. Approximately 1 week ago, they once again noted his state of intoxication and also noted inappropriate conduct, which the patient is not willing to clarify for me today, which eventually resulted in him being kicked out of the residential treatment facility. He was then referred to the [facility], however continued to report a strong intensity of suicidal urges, prompting them to refer him to the emergency room for mental health admission. In our emergency room he reported various plans for suicide, ranging from cutting his throat to hanging himself or jumping off of a bridge. He was seeking inpatient hospitalization. On examination today, he tells me that he is depressed secondary to his homeless situation. Other psychosocial stressors he identified include not being able to see his daughter as often as he would like. He further described limited social supports, describing his friends as being negative influences and having no meaningful relationship with family " members. He feels isolated, helpless, hopeless and sad. He questions why he is not on a wait list for Section 8 housing and questions the efficiency of his . He denied any psychotic symptoms and had no other meaningful reports today.       PSYCHIATRIC REVIEW OF SYSTEMS: Regarding the depressive episode, he tells me that his mood is depressed which he characterized as severe. He further reports low energy, low appetite, low concentration. He described anhedonia. He feels helpless, hopeless. He endorses suicidal thoughts. He feels safe in the hospital. He denied homicidal thoughts. He denied symptoms of aayush. He denied psychotic symptoms, including auditory and visual hallucinations. He denied any paranoid delusions, howeverdid appear moderately paranoid on meeting today. No ideas of reference, thought insertion or thought extraction identified. He denied symptoms corresponding to DYLAN, panic disorder, PTSD, eating disorder or OCD.       PAST PSYCHIATRIC HISTORY: Established diagnosis of schizophrenia with multiple prior inpatient hospitalizations, 2 prior suicide attempts via medication overdose in 2010 and 2011. He has been on multiple prior medication trials, some of which have included Risperdal, Seroquel, Abilify, Zyprexa, lithium, Prozac, however has most recently been maintained on a combination of lithium, Prozac and Zyprexa. Records indicate at least 3 prior court-ordered mental health treatments with no active commitment identified. His care is managed by his outpatient psychiatrist. He currently does not have a therapist.       SUBSTANCE ABUSE HISTORY: Long history of cannabis and alcohol usage. His most frequented substance is alcohol, describing regular usage on a daily basis while in residential treatment over the past 2-3 months. He was not able to report any meaningful dependence or withdrawal to alcohol. His last alcoholic beverage was approximately 1 week ago. Cannabis usage is  intermittent, smoking when available. No recent usage reported. He has been to treatment in the past. No complicated withdrawals stemming from DTs or seizures reported.        See Admission note by Stuart Priest MD for additional details.          DIagnoses:     1. Schizophrenia.   2. Alcohol use disorder, moderate.   3. Cannabis use disorder, moderate.  4. Malingering.  5. Likely, antisocial personality disorder.           Labs:     Results for ROXANNA BAÑUELOS (MRN [id number]) as of 3/1/2017 15:23   Ref. Range 2/27/2017 20:26 3/1/2017 07:36   Sodium Latest Ref Range: 133 - 144 mmol/L  138   Potassium Latest Ref Range: 3.4 - 5.3 mmol/L  3.8   Chloride Latest Ref Range: 94 - 109 mmol/L  106   Carbon Dioxide Latest Ref Range: 20 - 32 mmol/L  27   Urea Nitrogen Latest Ref Range: 7 - 30 mg/dL  13   Creatinine Latest Ref Range: 0.66 - 1.25 mg/dL  1.04   GFR Estimate Latest Ref Range: >60 mL/min/1.7m2  82   GFR Estimate If Black Latest Ref Range: >60 mL/min/1.7m2  >90...   Calcium Latest Ref Range: 8.5 - 10.1 mg/dL  8.7   Anion Gap Latest Ref Range: 3 - 14 mmol/L  5   Albumin Latest Ref Range: 3.4 - 5.0 g/dL  3.5   Protein Total Latest Ref Range: 6.8 - 8.8 g/dL  6.6 (L)   Bilirubin Total Latest Ref Range: 0.2 - 1.3 mg/dL  0.4   Alkaline Phosphatase Latest Ref Range: 40 - 150 U/L  76   ALT Latest Ref Range: 0 - 70 U/L  59   AST Latest Ref Range: 0 - 45 U/L  39   TSH Latest Ref Range: 0.40 - 4.00 mU/L  2.24   Glucose Latest Ref Range: 70 - 99 mg/dL  100 (H)   WBC Latest Ref Range: 4.0 - 11.0 10e9/L  6.0   Hemoglobin Latest Ref Range: 13.3 - 17.7 g/dL  15.2   Hematocrit Latest Ref Range: 40.0 - 53.0 %  45.4   Platelet Count Latest Ref Range: 150 - 450 10e9/L  250   RBC Count Latest Ref Range: 4.4 - 5.9 10e12/L  4.91   MCV Latest Ref Range: 78 - 100 fl  93   MCH Latest Ref Range: 26.5 - 33.0 pg  31.0   MCHC Latest Ref Range: 31.5 - 36.5 g/dL  33.5   RDW Latest Ref Range: 10.0 - 15.0 %  14.2   Diff Method Unknown   Automated Method   % Neutrophils Latest Units: %  41.2   % Lymphocytes Latest Units: %  44.3   % Monocytes Latest Units: %  9.0   % Eosinophils Latest Units: %  5.0   % Basophils Latest Units: %  0.3   % Immature Granulocytes Latest Units: %  0.2   Nucleated RBCs Latest Ref Range: 0 /100  0   Absolute Neutrophil Latest Ref Range: 1.6 - 8.3 10e9/L  2.5   Absolute Lymphocytes Latest Ref Range: 0.8 - 5.3 10e9/L  2.7   Absolute Monocytes Latest Ref Range: 0.0 - 1.3 10e9/L  0.5   Absolute Eosinophils Latest Ref Range: 0.0 - 0.7 10e9/L  0.3   Absolute Basophils Latest Ref Range: 0.0 - 0.2 10e9/L  0.0   Abs Immature Granulocytes Latest Ref Range: 0 - 0.4 10e9/L  0.0   Absolute Nucleated RBC Unknown  0.0   Lithium Level Latest Ref Range: 0.6 - 1.2 mmol/L  0.8   Amphetamine Qual Urine Latest Ref Range: NEG  Negative...    Cocaine Qual Urine Latest Ref Range: NEG  Negative...    Opiates Qualitative Urine Latest Ref Range: NEG  Negative...    Cannabinoids Qual Urine Latest Ref Range: NEG  Negative...    Barbiturates Qual Urine Latest Ref Range: NEG  Negative...    Benzodiazepine Qual Urine Latest Ref Range: NEG  Negative...    Ethanol Qual Urine Latest Ref Range: NEG  Negative...             Consults:     The patient was not seen by IM during this hospitalization.          Hospital Course:   Neil Andersen was admitted to [facility] with attending Stefan Guerra MD. The patient was placed under status 15 (15 minute checks) to ensure patient safety. He was restarted on his home medications, including Zyprexa, Propranolol, Lithium and Fluoxetine. He remained very irritable and not cooperative: he did indicate on a number of occasions that he didn't want to be at the hospital, but needed housing. He was reported during this hospital stay to be making inappropriate flirting remarks to female peer, hugging female patient. He became irritated when he was redirected and said that we were not his parents. He initially  "refused to answer questions about Suicidal ideation, then, said he didn't have it. He denied Auditory hallucinations and Visual hallucinations. He was seen today by [facility] and again stated that he needed housing. When he was told by [facility] that we could not provide him with housing and was asked whether he wanted to go to CD treatment, he first said: \"no\", then, \"yes\", but made a clear impression of not being interested in CD treatment. Today I found the patient in the lobby, he was talking with a female peer and after I introduced myself he refused to talk to me: \"come and talk to me later, now I am busy\". After I reminded him that I was his doctor and it was his responsibility as a patient to meet with me to discuss reasons for admission, his symptoms and his treatment, he reluctantly stood up and went to the interview room. He sat at the interview room with no eye contact, looking very angry. He refused to provide any information on why he came here. When asked what help he expected to get from coming here, he said: \"housing and you can't give it to me, I am tired of people pushing me around\". When I told him that I was his doctor, not his enemy and here to help him, he continued to remain silent, then, said he didn't want to be at the hospital, but didn't want to be discharged from the hospital, either. I asked him whether he wanted to go to CD program, he said: \"no\", stood up and walked outside of interview room. Short time afterwards I heard loud noise as Neil threw board game down the corridor.The patient gave an impression of angry, manipulative, but not psychotic or suicidal, one not willing to comply with unit rules and intimidating. He was discharged from the hospital.    Neil Andersen did not participate in groups and was visible in the milieu.     The patient's symptoms of depression have not improved.     Neil Andersen was released to back to community. At the time of discharge Neil COYNE" Ganesh was determined to not be a danger to himself or others.          Discharge Medications:     Current Discharge Medication List      CONTINUE these medications which have NOT CHANGED    Details   diphenhydrAMINE (BANOPHEN) 50 MG capsule Take 50 mg by mouth daily      Multiple Vitamins-Minerals (CEROVITE ADVANCED FORMULA PO)       FLUoxetine (PROZAC) 20 MG capsule Take 3 capsules (60 mg) by mouth At Bedtime  Qty: 90 capsule, Refills: 1    Associated Diagnoses: Paranoid schizophrenia, chronic condition (H)      OLANZapine (ZYPREXA) 15 MG tablet Take 2 tablets (30 mg) by mouth At Bedtime  Qty: 60 tablet, Refills: 1    Associated Diagnoses: Paranoid schizophrenia, chronic condition (H)      propranolol (INDERAL) 20 MG tablet Take 1 tablet (20 mg) by mouth 3 times daily  Qty: 90 tablet, Refills: 1    Associated Diagnoses: Paranoid schizophrenia, chronic condition (H); Extrapyramidal symptom      lithium (ESKALITH/LITHOBID) 300 MG CR tablet Take 2 tablets (600 mg) by mouth 2 times daily  Qty: 120 tablet, Refills: 1    Associated Diagnoses: Paranoid schizophrenia, chronic condition (H)         STOP taking these medications       Nicotine Polacrilex (NICORELIEF MT) Comments:   Reason for Stopping:                    Psychiatric Examination:   Appearance:  awake, alert, dressed in hospital scrubs and appeared as age stated  Attitude:  guarded and uncooperative  Eye Contact:  poor   Mood:  angry  Affect:  constricted mobility  Speech:  decreased prosody and paucity of speech  Psychomotor Behavior:  no evidence of tardive dyskinesia, dystonia, or tics, intact station, gait and muscle tone and physical agitation  Thought Process:  linear  Associations:  no loose associations  Thought Content:  no evidence of suicidal ideation or homicidal ideation and no evidence of psychotic thought  Insight:  limited  Judgment:  limited  Oriented to:  time, person, and place  Attention Span and Concentration:  limited  Recent and  Remote Memory:  limited  Language: Able to name objects, Able to repeat phrases and Able to read and write  Fund of Knowledge: low-normal  Muscle Strength and Tone: normal  Gait and Station: Normal         Discharge Plan:     Psychiatry Follow-up:   [facility]  [phone]   FAX:[phone] [facility] is requesting the hospital fax the discharge summary prior to making an appointment.  [facility] [address]  Dr. Collado        from [facility]: [place] [phone] (c) [phone]    Resources:   Crisis Intervention: 334.729.9542 or 828-885-3300 (TTY: 301.626.5791).  Call anytime for help.  National [place] on Mental Illness (www.mn.amada.org): 550.350.8837 or 920-240-2263.  Alcoholics Anonymous (www.alcoholics-anonymous.org): Check your phone book for your local chapter.  Suicide Awareness Voices of Education (SAVE) (www.save.org): 999-955-GBQT (2403)  National Suicide Prevention Line (www.mentalhealthmn.org): 299-134-FSNY (1497)    General Medication Instructions:   See your medication sheet(s) for instructions.   Take all medicines as directed.  Make no changes unless your doctor suggests them.   Go to all your doctor visits.  Be sure to have all your required lab tests. This way, your medicines can be refilled on time.  Do not use any drugs not prescribed by your doctor.  Avoid alcohol.      The treatment team has appreciated the opportunity to work with you.  We wish you the best in the future. If you have any questions or concerns our unit number is [phone].    Attestation:  The patient has been seen and evaluated by me,  Stefan Guerra MD for 50 min between 15:00 and 15:50.    ADDENDUM: the patient was seen and discharged on 3/1/17.  Stefan Guerra MD

## 2018-08-06 DIAGNOSIS — F25.0 SCHIZOAFFECTIVE DISORDER, BIPOLAR TYPE (H): Primary | ICD-10-CM

## 2018-08-07 DIAGNOSIS — F25.0 SCHIZOAFFECTIVE DISORDER, BIPOLAR TYPE (H): ICD-10-CM

## 2018-08-07 LAB
BASOPHILS # BLD AUTO: 0 10E9/L (ref 0–0.2)
BASOPHILS NFR BLD AUTO: 0.4 %
DIFFERENTIAL METHOD BLD: NORMAL
EOSINOPHIL # BLD AUTO: 0.2 10E9/L (ref 0–0.7)
EOSINOPHIL NFR BLD AUTO: 4.6 %
ERYTHROCYTE [DISTWIDTH] IN BLOOD BY AUTOMATED COUNT: 13.1 % (ref 10–15)
HCT VFR BLD AUTO: 45.5 % (ref 40–53)
HGB BLD-MCNC: 15.3 G/DL (ref 13.3–17.7)
LYMPHOCYTES # BLD AUTO: 2.2 10E9/L (ref 0.8–5.3)
LYMPHOCYTES NFR BLD AUTO: 46.8 %
MCH RBC QN AUTO: 29.1 PG (ref 26.5–33)
MCHC RBC AUTO-ENTMCNC: 33.6 G/DL (ref 31.5–36.5)
MCV RBC AUTO: 87 FL (ref 78–100)
MONOCYTES # BLD AUTO: 0.3 10E9/L (ref 0–1.3)
MONOCYTES NFR BLD AUTO: 6.1 %
NEUTROPHILS # BLD AUTO: 1.9 10E9/L (ref 1.6–8.3)
NEUTROPHILS NFR BLD AUTO: 42.1 %
PLATELET # BLD AUTO: 316 10E9/L (ref 150–450)
RBC # BLD AUTO: 5.26 10E12/L (ref 4.4–5.9)
WBC # BLD AUTO: 4.6 10E9/L (ref 4–11)

## 2018-08-07 PROCEDURE — 85025 COMPLETE CBC W/AUTO DIFF WBC: CPT

## 2018-08-07 PROCEDURE — 36415 COLL VENOUS BLD VENIPUNCTURE: CPT

## 2018-10-22 DIAGNOSIS — F25.0 SCHIZOAFFECTIVE DISORDER, BIPOLAR TYPE (H): ICD-10-CM

## 2018-10-22 LAB
BASOPHILS # BLD AUTO: 0 10E9/L (ref 0–0.2)
BASOPHILS NFR BLD AUTO: 0.3 %
DIFFERENTIAL METHOD BLD: NORMAL
EOSINOPHIL # BLD AUTO: 0.2 10E9/L (ref 0–0.7)
EOSINOPHIL NFR BLD AUTO: 3.7 %
ERYTHROCYTE [DISTWIDTH] IN BLOOD BY AUTOMATED COUNT: 13.8 % (ref 10–15)
HCT VFR BLD AUTO: 43.4 % (ref 40–53)
HGB BLD-MCNC: 14.5 G/DL (ref 13.3–17.7)
LYMPHOCYTES # BLD AUTO: 2.4 10E9/L (ref 0.8–5.3)
LYMPHOCYTES NFR BLD AUTO: 36.8 %
MCH RBC QN AUTO: 29.2 PG (ref 26.5–33)
MCHC RBC AUTO-ENTMCNC: 33.4 G/DL (ref 31.5–36.5)
MCV RBC AUTO: 87 FL (ref 78–100)
MONOCYTES # BLD AUTO: 0.5 10E9/L (ref 0–1.3)
MONOCYTES NFR BLD AUTO: 8 %
NEUTROPHILS # BLD AUTO: 3.3 10E9/L (ref 1.6–8.3)
NEUTROPHILS NFR BLD AUTO: 51.2 %
PLATELET # BLD AUTO: 281 10E9/L (ref 150–450)
RBC # BLD AUTO: 4.97 10E12/L (ref 4.4–5.9)
WBC # BLD AUTO: 6.5 10E9/L (ref 4–11)

## 2018-10-22 PROCEDURE — 36415 COLL VENOUS BLD VENIPUNCTURE: CPT | Performed by: INTERNAL MEDICINE

## 2018-10-22 PROCEDURE — 85025 COMPLETE CBC W/AUTO DIFF WBC: CPT | Performed by: INTERNAL MEDICINE

## 2019-01-01 NOTE — PROGRESS NOTES
"Arrived to unit 20 from [facility] at approximately 2315.  Cooperative with search. Asking for ice cream. Became angry and asking \"why is there a smiley face in it?\" [part of cup had torn away when lid was removed.] Unwilling to wait for Nurse to do admission and went to bed. At 2350 Nurse asked patient to come to interview room,which he did. Requesting ice cream, but wanted Nurse to get it for him and not the PA that had previously given him the smiley face ice cream. Patient asking immediately what his plan of care was. \"I don't want a plan to get me out of here. I am homeless. I'm staying here until they find  a place for me to live.\" Nurse then asked to begin interview and patient said \" I'm not telling you shit.\" Asking to take own meds and go to bed. Willing to take meds from hospital pharmacy after policy explained. Glaring at staff. Irritable, hostile and uncooperative with trying to do any part of interview. Showed patient list of meds to be given at , opened all meds while patient was observing,included Atarax for anxiety. Patient took all meds, then accused Nurse of giving him an overdose and giving him Atarax without his consent. \" Don't fuck with me again.\" PATIENT THEN WENT TO BED AND HAS APPEARED TO SLEEP SINCE THEN.  " hard copy, drawn during this pregnancy